# Patient Record
Sex: MALE | Race: WHITE | Employment: OTHER | URBAN - METROPOLITAN AREA
[De-identification: names, ages, dates, MRNs, and addresses within clinical notes are randomized per-mention and may not be internally consistent; named-entity substitution may affect disease eponyms.]

---

## 2017-10-02 ENCOUNTER — OFFICE VISIT (OUTPATIENT)
Dept: DERMATOLOGY | Age: 72
End: 2017-10-02

## 2017-10-02 DIAGNOSIS — Z85.828 HISTORY OF SKIN CANCER: ICD-10-CM

## 2017-10-02 DIAGNOSIS — D22.9 MULTIPLE NEVI: Primary | ICD-10-CM

## 2017-10-02 DIAGNOSIS — Z86.018 HISTORY OF DYSPLASTIC NEVUS: ICD-10-CM

## 2017-10-02 DIAGNOSIS — L57.0 AK (ACTINIC KERATOSIS): ICD-10-CM

## 2017-10-02 DIAGNOSIS — L82.1 SEBORRHEIC KERATOSIS: ICD-10-CM

## 2017-10-02 DIAGNOSIS — D48.5 NEOPLASM OF UNCERTAIN BEHAVIOR OF SKIN: ICD-10-CM

## 2017-10-02 PROCEDURE — 17000 DESTRUCT PREMALG LESION: CPT | Performed by: DERMATOLOGY

## 2017-10-02 PROCEDURE — 11100 PR BIOPSY OF SKIN LESION: CPT | Performed by: DERMATOLOGY

## 2017-10-02 PROCEDURE — 99213 OFFICE O/P EST LOW 20 MIN: CPT | Performed by: DERMATOLOGY

## 2017-10-02 PROCEDURE — 17003 DESTRUCT PREMALG LES 2-14: CPT | Performed by: DERMATOLOGY

## 2017-10-02 NOTE — MR AVS SNAPSHOT
After Visit Summary             Frank Bar   10/2/2017 8:45 AM   Office Visit    Description:  Male : 1945   Provider:  Meredith Broussard MD   Department:  [facility]              Your Follow-Up and Future Appointments         Below is a list of your follow-up and future appointments. This may not be a complete list as you may have made appointments directly with providers that we are not aware of or your providers may have made some for you. Please call your providers to confirm appointments. It is important to keep your appointments. Please bring your current insurance card, photo ID, co-pay, and all medication bottles to your appointment. If self-pay, payment is expected at the time of service. Your To-Do List     Follow-Up    Return in about 1 year (around 10/2/2018). Information from Your Visit        Department     Name Address Phone Fax    [facility] [id number] [phone]      Vital Signs     Smoking Status                   Never Smoker           Instructions      Biopsy Wound Care Instructions    · Keep the bandage in place for 24 hours. · Cleanse the wound with mild soapy water daily  ? Gently dry the area. ? Apply Vaseline or petroleum jelly to the wound using a cotton tipped applicator. ? Cover with a clean bandage. ? Repeat this process until the biopsy site is healed. ? If you had stitches placed, continue treating the site until the stitches are removed. Remember to make an appointment to return to have your stitches removed by our staff. ? You may shower and bathe as usual.       ** Biopsy results generally take around 7 business days to come back. If you have not heard from us by then, please call the office at [phone] between 8AM and 4PM Monday through Friday.       Cryosurgery (Freezing) Wound Care Instructions    AFTER THE PROCEDURE:

## 2017-10-02 NOTE — PROGRESS NOTES
well-appearing  The following were examined and determined to be normal: Psych/Neuro, Conjunctivae/eyelids, Gums/teeth/lips, Neck, Breast/chest, RUE, LUE, RLE, LLE, Nails/digits and buttocks. Abdomen. scalp, axilla. The following were examined and determined to be abnormal: face, back  trunk and extremities with scattered brown and red macules and papules   Scar(s) clear  FH, L temple and L infraorbital with erythematous roughly scaled macules  L flank with stuck-on dull-surfaced brown papule with peripheral erythema  Scalp clear except for prurigo/scar on the occiput    Assessment and Plan     1. Benign-appearing nevi, SK's, and hx of dysplastic nevus  - educ re ABCD's of MM   educ sun protection   encouraged skin check yearly (sooner if indicated), self checks    2. AK - FH, L temple and L infraorbital  - 3 lesion(s) on the above areas treated with liquid nitrogen x 2 cycles. Patient educated on risk of blister, hypopigmentation/scar and wound care. 3. Hx of NMSC, no signs of recurrence  - educ re ABCD's of MM   educ sun protection   encouraged skin check yearly (sooner if indicated), self checks    4. R/o ISK - L flank  - Shave biopsy performed after verbal consent obtained. Patient educated regarding risk of bleeding, infection, scar and educated on wound care. Skin cleansed with alcohol pad and site anesthetized with lido + epi. Aluminum chloride applied to site for hemostasis. Petrolatum ointment and bandage applied. Specimen bottle labeled with patient information and site and specimen sent to [facility].      Previously addressed Folliculitis - scalp - clinda soln daily - bid prn flares

## 2017-10-09 ENCOUNTER — TELEPHONE (OUTPATIENT)
Dept: DERMATOLOGY | Age: 72
End: 2017-10-09

## 2018-10-08 ENCOUNTER — OFFICE VISIT (OUTPATIENT)
Dept: DERMATOLOGY | Age: 73
End: 2018-10-08
Payer: MEDICARE

## 2018-10-08 DIAGNOSIS — S90.222A: ICD-10-CM

## 2018-10-08 DIAGNOSIS — D48.5 NEOPLASM OF UNCERTAIN BEHAVIOR OF SKIN: ICD-10-CM

## 2018-10-08 DIAGNOSIS — Z86.018 HISTORY OF DYSPLASTIC NEVUS: ICD-10-CM

## 2018-10-08 DIAGNOSIS — D22.9 MULTIPLE NEVI: Primary | ICD-10-CM

## 2018-10-08 DIAGNOSIS — Z85.828 HISTORY OF NONMELANOMA SKIN CANCER: ICD-10-CM

## 2018-10-08 DIAGNOSIS — L57.0 AK (ACTINIC KERATOSIS): ICD-10-CM

## 2018-10-08 DIAGNOSIS — L82.1 SEBORRHEIC KERATOSIS: ICD-10-CM

## 2018-10-08 PROCEDURE — 11100 PR BIOPSY OF SKIN LESION: CPT | Performed by: DERMATOLOGY

## 2018-10-08 PROCEDURE — 3017F COLORECTAL CA SCREEN DOC REV: CPT | Performed by: DERMATOLOGY

## 2018-10-08 PROCEDURE — 99213 OFFICE O/P EST LOW 20 MIN: CPT | Performed by: DERMATOLOGY

## 2018-10-08 PROCEDURE — 4040F PNEUMOC VAC/ADMIN/RCVD: CPT | Performed by: DERMATOLOGY

## 2018-10-08 PROCEDURE — G8484 FLU IMMUNIZE NO ADMIN: HCPCS | Performed by: DERMATOLOGY

## 2018-10-08 PROCEDURE — 1101F PT FALLS ASSESS-DOCD LE1/YR: CPT | Performed by: DERMATOLOGY

## 2018-10-08 PROCEDURE — 17003 DESTRUCT PREMALG LES 2-14: CPT | Performed by: DERMATOLOGY

## 2018-10-08 PROCEDURE — G8427 DOCREV CUR MEDS BY ELIG CLIN: HCPCS | Performed by: DERMATOLOGY

## 2018-10-08 PROCEDURE — G8421 BMI NOT CALCULATED: HCPCS | Performed by: DERMATOLOGY

## 2018-10-08 PROCEDURE — 1123F ACP DISCUSS/DSCN MKR DOCD: CPT | Performed by: DERMATOLOGY

## 2018-10-08 PROCEDURE — 17000 DESTRUCT PREMALG LESION: CPT | Performed by: DERMATOLOGY

## 2018-10-08 PROCEDURE — 1036F TOBACCO NON-USER: CPT | Performed by: DERMATOLOGY

## 2018-10-08 RX ORDER — UBIDECARENONE 10 MG
CAPSULE ORAL
COMMUNITY

## 2018-10-08 RX ORDER — CLINDAMYCIN PHOSPHATE 11.9 MG/ML
SOLUTION TOPICAL
Qty: 60 ML | Refills: 4 | Status: SHIPPED | OUTPATIENT
Start: 2018-10-08 | End: 2020-11-24 | Stop reason: SDUPTHER

## 2018-10-08 NOTE — PATIENT INSTRUCTIONS
Biopsy Wound Care Instructions    · Keep the bandage in place for 24 hours. · Cleanse the wound with mild soapy water daily   Gently dry the area.  Apply Vaseline or petroleum jelly to the wound using a cotton tipped applicator.  Cover with a clean bandage.  Repeat this process until the biopsy site is healed.  If you had stitches placed, continue treating the site until the stitches are removed. Remember to make an appointment to return to have your stitches removed by our staff.  You may shower and bathe as usual.       ** Biopsy results generally take around 7 business days to come back. If you have not heard from us by then, please call the office at [phone] between 8AM and 4PM Monday through Friday. Cryosurgery (Freezing) Wound Care Instructions    AFTER THE PROCEDURE:    You will notice swelling and redness around the site. This is normal.    You may experience a sharp or sore feeling for the next several days. For this discomfort, you may take acetaminophen (Tylenol©).  A blister may develop at the treated area, sometimes as soon as by the end of the day. After several days, the blister will subside and a scab will form.  If the area is bumped or traumatized during the first few days following freezing, you may develop bleeding into the blister, forming a blood blister. This is nothing to be alarmed about.  If the blister is tense, uncomfortable, or much larger than the site that was frozen, you may pop the blister along its edge with a sterile needle (boiled, heated under a flame, or cleaned with alcohol) to allow the fluid to drain out. If the blister does not bother you, no treatment is needed.  Do NOT peel off the top of the blister roof. It will act as a dressing on top of your wound. WOUND CARE:    You may shower or bathe as usual, but avoid scrubbing the areas that have been frozen.      Cleanse the site twice a day with mild soapy water, and then apply a

## 2018-10-10 LAB — DERMATOLOGY PATHOLOGY REPORT: NORMAL

## 2018-10-19 ENCOUNTER — OFFICE VISIT (OUTPATIENT)
Dept: ORTHOPEDIC SURGERY | Age: 73
End: 2018-10-19
Payer: MEDICARE

## 2018-10-19 VITALS
BODY MASS INDEX: 28.63 KG/M2 | WEIGHT: 200 LBS | DIASTOLIC BLOOD PRESSURE: 84 MMHG | SYSTOLIC BLOOD PRESSURE: 139 MMHG | HEART RATE: 67 BPM | HEIGHT: 70 IN

## 2018-10-19 DIAGNOSIS — M25.562 LEFT KNEE PAIN, UNSPECIFIED CHRONICITY: Primary | ICD-10-CM

## 2018-10-19 PROCEDURE — G8419 CALC BMI OUT NRM PARAM NOF/U: HCPCS | Performed by: ORTHOPAEDIC SURGERY

## 2018-10-19 PROCEDURE — 4040F PNEUMOC VAC/ADMIN/RCVD: CPT | Performed by: ORTHOPAEDIC SURGERY

## 2018-10-19 PROCEDURE — 1036F TOBACCO NON-USER: CPT | Performed by: ORTHOPAEDIC SURGERY

## 2018-10-19 PROCEDURE — 3017F COLORECTAL CA SCREEN DOC REV: CPT | Performed by: ORTHOPAEDIC SURGERY

## 2018-10-19 PROCEDURE — G8484 FLU IMMUNIZE NO ADMIN: HCPCS | Performed by: ORTHOPAEDIC SURGERY

## 2018-10-19 PROCEDURE — 1123F ACP DISCUSS/DSCN MKR DOCD: CPT | Performed by: ORTHOPAEDIC SURGERY

## 2018-10-19 PROCEDURE — G8427 DOCREV CUR MEDS BY ELIG CLIN: HCPCS | Performed by: ORTHOPAEDIC SURGERY

## 2018-10-19 PROCEDURE — 20610 DRAIN/INJ JOINT/BURSA W/O US: CPT | Performed by: ORTHOPAEDIC SURGERY

## 2018-10-19 PROCEDURE — 1101F PT FALLS ASSESS-DOCD LE1/YR: CPT | Performed by: ORTHOPAEDIC SURGERY

## 2018-10-19 PROCEDURE — 99203 OFFICE O/P NEW LOW 30 MIN: CPT | Performed by: ORTHOPAEDIC SURGERY

## 2018-10-19 NOTE — PROGRESS NOTES
Depo medrol NDC [id number]  Lot# [id number]  Exp.8/2020   Knee, left    Ropivacaine    [facility]#:  [id number]  LOT#:  [id number]  Exp Date:  05/22  Knee, left
compression of patella. Nontender to light touch. He has pain at the mid body of the meniscus on the medial side. No lateral joint line pain. Range of Motion: Full ROM. Strength: 5/5 quad strength    Effusion: No apparent effusion. Ligamentous stability: Stable to valgus and varus stress at 0° and 30°. Solid endpoint with Lachman's. Negative posterior and anterior drawer signs. Patella tracking: Smooth translation of patella. Negative J sign. No retinacular tenderness. Special tests: Positive Debra sign with compression of the medial compartment but no appreciable click or pop. . Patella apprehension sign negative. Neurologic and vascular: Skin is warm and well-perfused. Distally neurovascularly intact. Additional findings: Calf soft nontender. Sensation is intact to light-touch. No pretibial edema. Right comparison knee exam    Gait: No use of assistive devices. No antalgic gait. Alignment: Alignment appreciated. Inspection/skin: Quad atrophy as well as actually below the knee is appreciated consistent with history of polio. Skin is intact without erythema or ecchymosis. No gross deformity. Palpation: No crepitus. Nontender along joint line. No pain with compression of patella. Nontender to light touch. Range of Motion: Full ROM. Strength: 4/5 quad strength    Effusion: No apparent effusion. Ligamentous stability: Stable to valgus and varus stress at 0° and 30°. Solid endpoint with Lachman's. Negative posterior and anterior drawer signs. Patella tracking: Smooth translation of patella. Negative J sign. No retinacular tenderness. Special tests: Negative Debra sign. Patella apprehension sign negative. Neurologic and vascular: Skin is warm and well-perfused. Distally neurovascularly intact. Additional findings: Calf soft nontender. Sensation is intact to light-touch. No pretibial edema.          Diagnostics:  Radiology:     Standing bilateral AP as

## 2019-11-26 ENCOUNTER — OFFICE VISIT (OUTPATIENT)
Dept: DERMATOLOGY | Age: 74
End: 2019-11-26
Payer: MEDICARE

## 2019-11-26 DIAGNOSIS — D48.5 NEOPLASM OF UNCERTAIN BEHAVIOR OF SKIN: ICD-10-CM

## 2019-11-26 DIAGNOSIS — L82.1 SEBORRHEIC KERATOSIS: ICD-10-CM

## 2019-11-26 DIAGNOSIS — Z85.828 HISTORY OF NONMELANOMA SKIN CANCER: ICD-10-CM

## 2019-11-26 DIAGNOSIS — Z86.018 HISTORY OF DYSPLASTIC NEVUS: ICD-10-CM

## 2019-11-26 DIAGNOSIS — D22.9 MULTIPLE NEVI: Primary | ICD-10-CM

## 2019-11-26 DIAGNOSIS — L82.0 INFLAMED SEBORRHEIC KERATOSIS: ICD-10-CM

## 2019-11-26 PROCEDURE — G8427 DOCREV CUR MEDS BY ELIG CLIN: HCPCS | Performed by: DERMATOLOGY

## 2019-11-26 PROCEDURE — 17110 DESTRUCTION B9 LES UP TO 14: CPT | Performed by: DERMATOLOGY

## 2019-11-26 PROCEDURE — G8484 FLU IMMUNIZE NO ADMIN: HCPCS | Performed by: DERMATOLOGY

## 2019-11-26 PROCEDURE — 1036F TOBACCO NON-USER: CPT | Performed by: DERMATOLOGY

## 2019-11-26 PROCEDURE — 1123F ACP DISCUSS/DSCN MKR DOCD: CPT | Performed by: DERMATOLOGY

## 2019-11-26 PROCEDURE — 99213 OFFICE O/P EST LOW 20 MIN: CPT | Performed by: DERMATOLOGY

## 2019-11-26 PROCEDURE — 4040F PNEUMOC VAC/ADMIN/RCVD: CPT | Performed by: DERMATOLOGY

## 2019-11-26 PROCEDURE — 11103 TANGNTL BX SKIN EA SEP/ADDL: CPT | Performed by: DERMATOLOGY

## 2019-11-26 PROCEDURE — G8421 BMI NOT CALCULATED: HCPCS | Performed by: DERMATOLOGY

## 2019-11-26 PROCEDURE — 11102 TANGNTL BX SKIN SINGLE LES: CPT | Performed by: DERMATOLOGY

## 2019-12-03 LAB — DERMATOLOGY PATHOLOGY REPORT: NORMAL

## 2020-10-09 ENCOUNTER — OFFICE VISIT (OUTPATIENT)
Dept: ORTHOPEDIC SURGERY | Age: 75
End: 2020-10-09
Payer: MEDICARE

## 2020-10-09 VITALS — HEIGHT: 70 IN | WEIGHT: 200 LBS | BODY MASS INDEX: 28.63 KG/M2 | TEMPERATURE: 98.6 F

## 2020-10-09 PROCEDURE — G8484 FLU IMMUNIZE NO ADMIN: HCPCS | Performed by: ORTHOPAEDIC SURGERY

## 2020-10-09 PROCEDURE — 1036F TOBACCO NON-USER: CPT | Performed by: ORTHOPAEDIC SURGERY

## 2020-10-09 PROCEDURE — 1123F ACP DISCUSS/DSCN MKR DOCD: CPT | Performed by: ORTHOPAEDIC SURGERY

## 2020-10-09 PROCEDURE — G8427 DOCREV CUR MEDS BY ELIG CLIN: HCPCS | Performed by: ORTHOPAEDIC SURGERY

## 2020-10-09 PROCEDURE — G8417 CALC BMI ABV UP PARAM F/U: HCPCS | Performed by: ORTHOPAEDIC SURGERY

## 2020-10-09 PROCEDURE — 99213 OFFICE O/P EST LOW 20 MIN: CPT | Performed by: ORTHOPAEDIC SURGERY

## 2020-10-09 PROCEDURE — 4040F PNEUMOC VAC/ADMIN/RCVD: CPT | Performed by: ORTHOPAEDIC SURGERY

## 2020-10-09 PROCEDURE — 20610 DRAIN/INJ JOINT/BURSA W/O US: CPT | Performed by: ORTHOPAEDIC SURGERY

## 2020-10-09 RX ORDER — METHYLPREDNISOLONE ACETATE 40 MG/ML
40 INJECTION, SUSPENSION INTRA-ARTICULAR; INTRALESIONAL; INTRAMUSCULAR; SOFT TISSUE ONCE
Status: COMPLETED | OUTPATIENT
Start: 2020-10-09 | End: 2020-10-09

## 2020-10-09 RX ORDER — ROPIVACAINE HYDROCHLORIDE 5 MG/ML
30 INJECTION, SOLUTION EPIDURAL; INFILTRATION; PERINEURAL ONCE
Status: COMPLETED | OUTPATIENT
Start: 2020-10-09 | End: 2020-10-09

## 2020-10-09 RX ADMIN — METHYLPREDNISOLONE ACETATE 40 MG: 40 INJECTION, SUSPENSION INTRA-ARTICULAR; INTRALESIONAL; INTRAMUSCULAR; SOFT TISSUE at 11:43

## 2020-10-09 RX ADMIN — ROPIVACAINE HYDROCHLORIDE 30 ML: 5 INJECTION, SOLUTION EPIDURAL; INFILTRATION; PERINEURAL at 11:44

## 2020-10-09 NOTE — PROGRESS NOTES
losartan-hydrochlorothiazide (HYZAAR) 100-25 MG per tablet, , Disp: , Rfl:     aspirin 81 MG tablet, Take 81 mg by mouth daily, Disp: , Rfl:     Multiple Vitamins-Minerals (CENTRUM SILVER) TABS, Take by mouth daily, Disp: , Rfl:     melatonin 3 MG TABS tablet, Take 9 mg by mouth daily, Disp: , Rfl:     meloxicam (MOBIC) 15 MG tablet, , Disp: , Rfl:       Allergies   Allergen Reactions    Statins      Muscle pain and weakness         Review of Systems: A 14 point review of systems was completed by the patient on 10/9/20and is available in the media section of the scanned medical record and was reviewed today  The review is negative with the exception of those things mentioned in the HPI    No notes on file    Physical Exam:  There were no vitals taken for this visit. General: No acute distress, well nourished  CV: No obvious peripheral edema. Normal peripheral pulses  Neuro: Alert & oriented x 3  Psych: Appropriate mood and affect      Right knee exam      Alignment: normal alignment. Inspection/skin: Skin is intact without erythema or ecchymosis. Significant gastroc atrophy compared to contralateral side due to polio    Palpation: mild crepitus. Medial joint line tenderness. Fullness along the posterior aspect of the knee. Range of Motion: Lacks 5 degrees of full extension. Flexion 9 degrees    Strength: Normal quadriceps development. Effusion: Moderate effusion. Ligamentous stability: No cruciate or collateral ligament instability. Neurologic and vascular: Skin is warm and well-perfused. Sensation is intact to light-touch. Special tests: Negative Debra sign. Comparison Left knee exam    Gait: No use of assistive devices. No antalgic gait. Alignment: normal alignment. Inspection/skin: Skin is intact without erythema or ecchymosis. No gross deformity. Palpation: mild crepitus. no joint line tenderness present. Range of Motion: There is full range of motion. Strength: Normal quadriceps development. Effusion: No effusion or swelling present. Ligamentous stability: No cruciate or collateral ligament instability. Neurologic and vascular: Skin is warm and well-perfused. Sensation is intact to light-touch. Special tests: Negative Debra sign. Radiographic:  X-rays obtained and reviewed in office, reviewed and interpreted by me today:  Bilateral knee AP, merchant, lateral R knee: no fracture or dislocation. Medial joint space narrowing. Subchondral sclerosis medially. Assessment:  Hailey Liang is a 66 y.o. male with right knee osteoarthritis, possible medial meniscus tear  Impression:  No diagnosis found. Office Procedures:  No orders of the defined types were placed in this encounter. Plan:   -Knee aspirated and steroid injection provided today  -continue NSAIDs PRN  -If no improvement in symptoms, will consider obtaining an MRI of the right knee within 1 to 2 weeks otherwise can follow-up in 1 month's time    Procedure note: R knee intra-articular injection/aspiration    After discussion of the risk, benefits, alternatives, patient agreeable to proceed with intra-articular steroid injection. Skin prepped with alcohol under sterile technique. 2 cc of ropivacaine injected in the skin at the superolateral patellar pouch. Utilizing an 18-gauge needle 50 cc of clear synovial joint fluid aspirated. Solution of 80 mg of Depo-Medrol with 15 mg of ropivacaine injected intra-articularly in the same area as aspiration. Sterile dressing was applied. Patient tolerated procedure well. Efrain Bella MD  Orthopaedic Fellow  [facility] and [address] [place] [address]      The encounter with Hailey Liang was supervised by Dr Virginia Jorgensen who personally examined the patient and reviewed the plan. This dictation was performed with a verbal recognition program (DRAGON) and it was checked for errors.   It is possible that there are still dictated errors within this office note. If so, please bring any errors to my attention for an addendum. All efforts were made to ensure that this office note is accurate. Attestation:  I was physically present and performed my own examination of this patient and have discussed the case, including pertinent history and exam findings with the fellow. I agree with the documented assessment and plan. Jovanna Glasgow.  Mario Florez MD

## 2020-11-06 ENCOUNTER — OFFICE VISIT (OUTPATIENT)
Dept: ORTHOPEDIC SURGERY | Age: 75
End: 2020-11-06
Payer: MEDICARE

## 2020-11-06 VITALS — BODY MASS INDEX: 28.63 KG/M2 | WEIGHT: 200 LBS | HEIGHT: 70 IN | TEMPERATURE: 97.6 F

## 2020-11-06 PROCEDURE — 1036F TOBACCO NON-USER: CPT | Performed by: ORTHOPAEDIC SURGERY

## 2020-11-06 PROCEDURE — G8484 FLU IMMUNIZE NO ADMIN: HCPCS | Performed by: ORTHOPAEDIC SURGERY

## 2020-11-06 PROCEDURE — G8417 CALC BMI ABV UP PARAM F/U: HCPCS | Performed by: ORTHOPAEDIC SURGERY

## 2020-11-06 PROCEDURE — 99212 OFFICE O/P EST SF 10 MIN: CPT | Performed by: ORTHOPAEDIC SURGERY

## 2020-11-06 PROCEDURE — 1123F ACP DISCUSS/DSCN MKR DOCD: CPT | Performed by: ORTHOPAEDIC SURGERY

## 2020-11-06 PROCEDURE — 4040F PNEUMOC VAC/ADMIN/RCVD: CPT | Performed by: ORTHOPAEDIC SURGERY

## 2020-11-06 PROCEDURE — G8427 DOCREV CUR MEDS BY ELIG CLIN: HCPCS | Performed by: ORTHOPAEDIC SURGERY

## 2020-11-06 NOTE — PROGRESS NOTES
The patient returns today for follow-up of his right knee. He had some swelling and pain in a month ago we did an aspiration and injection. He said it took about a week but he is back to baseline now with minimal tenderness and is functioning normally. ROS: Pertinent items are noted in HPI. No notes on file    Past Medical History:  No date: Hyperlipidemia  No date: Hypertension     History reviewed. No pertinent surgical history. History reviewed. No pertinent family history. Social History    Socioeconomic History      Marital status:       Spouse name: None      Number of children: None      Years of education: None      Highest education level: None    Occupational History      None    Social Needs      Financial resource strain: None      Food insecurity        Worry: None        Inability: None      Transportation needs        Medical: None        Non-medical: None    Tobacco Use      Smoking status: Never Smoker      Smokeless tobacco: Never Used    Substance and Sexual Activity      Alcohol use: None      Drug use: None      Sexual activity: None    Lifestyle      Physical activity        Days per week: None        Minutes per session: None      Stress: None    Relationships      Social connections        Talks on phone: None        Gets together: None        Attends Confucianist service: None        Active member of club or organization: None        Attends meetings of clubs or organizations: None        Relationship status: None      Intimate partner violence        Fear of current or ex partner: None        Emotionally abused: None        Physically abused: None        Forced sexual activity: None    Other Topics      Concerns:        None    Social History Narrative      None      Current Outpatient Medications:  Coenzyme Q10 10 MG CAPS, Take by mouth, Disp: , Rfl:   clindamycin (CLEOCIN T) 1 % external solution, Apply to affected areas on the scalp daily - BID prn flares. , Disp: 60

## 2020-11-24 ENCOUNTER — OFFICE VISIT (OUTPATIENT)
Dept: DERMATOLOGY | Age: 75
End: 2020-11-24
Payer: MEDICARE

## 2020-11-24 VITALS — TEMPERATURE: 97.3 F

## 2020-11-24 PROCEDURE — G8417 CALC BMI ABV UP PARAM F/U: HCPCS | Performed by: DERMATOLOGY

## 2020-11-24 PROCEDURE — 17000 DESTRUCT PREMALG LESION: CPT | Performed by: DERMATOLOGY

## 2020-11-24 PROCEDURE — G8484 FLU IMMUNIZE NO ADMIN: HCPCS | Performed by: DERMATOLOGY

## 2020-11-24 PROCEDURE — 1036F TOBACCO NON-USER: CPT | Performed by: DERMATOLOGY

## 2020-11-24 PROCEDURE — 99214 OFFICE O/P EST MOD 30 MIN: CPT | Performed by: DERMATOLOGY

## 2020-11-24 PROCEDURE — 17003 DESTRUCT PREMALG LES 2-14: CPT | Performed by: DERMATOLOGY

## 2020-11-24 PROCEDURE — 4040F PNEUMOC VAC/ADMIN/RCVD: CPT | Performed by: DERMATOLOGY

## 2020-11-24 PROCEDURE — G8427 DOCREV CUR MEDS BY ELIG CLIN: HCPCS | Performed by: DERMATOLOGY

## 2020-11-24 PROCEDURE — 1123F ACP DISCUSS/DSCN MKR DOCD: CPT | Performed by: DERMATOLOGY

## 2020-11-24 RX ORDER — CLINDAMYCIN PHOSPHATE 11.9 MG/ML
SOLUTION TOPICAL
Qty: 60 ML | Refills: 4 | Status: SHIPPED | OUTPATIENT
Start: 2020-11-24 | End: 2022-07-20 | Stop reason: SDUPTHER

## 2020-11-24 NOTE — PROGRESS NOTES
[facility] Dermat[facility]  López Mireles MD  [address]  1945    66 y.o. male     Date of Visit: 11/24/2020    Chief Complaint: f/u skin cancer, lesions  Chief Complaint   Patient presents with    Skin Lesion     FSe       HX: Multi nevi     Last seen:   his wife is a patient    History of Present Illness:    1. Here for evaluation of multiple asx brown and red lesions on the trunk and extremities, present for many years; no change in size/shape/color of any lesions; no bleeding lesions. S/p bx of lesion on the abdomen at previous visit - read as atypical nevus (mild dysplasia, removed with bx). No probs since last seen. 2. Hx AK's - few new concerns on the face. Asx.    3. History of skin cancer - BCC on the face and SCC on the chest.  No probs with previous sites. No new concerns. 4. F/sofy folliculitis - scalp - continues to flare focally, intermittently. clinda topically helps. Previously with discoloration of the L 3rd toenail - grew out and resolved. superficial SCC of chest (8/2009) s/p ED&C - treated at [facility] on the face    He avoids the sun or wears sunscreen/hat when he is out. No tanning bed use. 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. Review of Systems:  Gen: Feels well, good sense of health. Skin: No changing moles or lesions. Past Medical History, Family History, Surgical History, Medications and Allergies reviewed. Past Medical History:   Diagnosis Date    Hyperlipidemia     Hypertension        No past surgical history on file. Outpatient Medications Marked as Taking for the 11/24/20 encounter (Office Visit) with Shelby Pretty MD   Medication Sig Dispense Refill    Coenzyme Q10 10 MG CAPS Take by mouth      clindamycin (CLEOCIN T) 1 % external solution Apply to affected areas on the scalp daily - BID prn flares.  60 mL 4    losartan-hydrochlorothiazide (HYZAAR) 100-25 MG per tablet       aspirin 81 MG tablet Take 81 mg by mouth daily      Multiple Vitamins-Minerals (CENTRUM SILVER) TABS Take by mouth daily      meloxicam (MOBIC) 15 MG tablet          Allergies   Allergen Reactions    Statins      Muscle pain and weakness         Physical Examination     Gen, well-appearing  The following were examined and determined to be normal: Psych/Neuro, Conjunctivae/eyelids, Gums/teeth/lips, Neck, Breast/chest, RUE, RLE, LLE, Nails/digits and buttocks. Abdomen. scalp, axilla. back    The following were examined and determined to be abnormal: face, LUE  trunk and extremities with scattered brown and red macules and papules   Scar(s) clear  Face with erythematous roughly scaled macules  Scalp clear except for 2 excoriations  L 3rd toenail with hemorrhage again after clear last year    Occipital scalp with 2 crusted excoriations with mild peripheral lichenification                Assessment and Plan     1. Benign-appearing nevi, SK's, and hx of dysplastic nevus  - educ re ABCD's of MM   educ sun protection   encouraged skin check yearly (sooner if indicated), self checks    2. AK - 3 new lesions today on the face  - 3 lesion(s) treated with liquid nitrogen x 2 cycles. Patient educated on risk of blister, hypopigmentation/scar and wound care. 3. Hx of NMSC, no signs of recurrence  - educ re ABCD's of MM   educ sun protection   encouraged skin check yearly (sooner if indicated), self checks    4.  Folliculitis + prurigo- scalp - mild and focal  - clinda soln daily - bid prn flares  - avoid scratching

## 2022-02-08 ENCOUNTER — OFFICE VISIT (OUTPATIENT)
Dept: DERMATOLOGY | Age: 77
End: 2022-02-08
Payer: MEDICARE

## 2022-02-08 VITALS — TEMPERATURE: 97.2 F

## 2022-02-08 DIAGNOSIS — Z85.828 HISTORY OF NONMELANOMA SKIN CANCER: ICD-10-CM

## 2022-02-08 DIAGNOSIS — Z86.018 HISTORY OF DYSPLASTIC NEVUS: ICD-10-CM

## 2022-02-08 DIAGNOSIS — L82.0 INFLAMED SEBORRHEIC KERATOSIS: ICD-10-CM

## 2022-02-08 DIAGNOSIS — L57.0 AK (ACTINIC KERATOSIS): ICD-10-CM

## 2022-02-08 DIAGNOSIS — L82.1 SEBORRHEIC KERATOSIS: ICD-10-CM

## 2022-02-08 DIAGNOSIS — D22.9 MULTIPLE NEVI: Primary | ICD-10-CM

## 2022-02-08 PROCEDURE — G8427 DOCREV CUR MEDS BY ELIG CLIN: HCPCS | Performed by: DERMATOLOGY

## 2022-02-08 PROCEDURE — 1123F ACP DISCUSS/DSCN MKR DOCD: CPT | Performed by: DERMATOLOGY

## 2022-02-08 PROCEDURE — 4040F PNEUMOC VAC/ADMIN/RCVD: CPT | Performed by: DERMATOLOGY

## 2022-02-08 PROCEDURE — 17110 DESTRUCTION B9 LES UP TO 14: CPT | Performed by: DERMATOLOGY

## 2022-02-08 PROCEDURE — G8484 FLU IMMUNIZE NO ADMIN: HCPCS | Performed by: DERMATOLOGY

## 2022-02-08 PROCEDURE — 99213 OFFICE O/P EST LOW 20 MIN: CPT | Performed by: DERMATOLOGY

## 2022-02-08 PROCEDURE — G8421 BMI NOT CALCULATED: HCPCS | Performed by: DERMATOLOGY

## 2022-02-08 PROCEDURE — 1036F TOBACCO NON-USER: CPT | Performed by: DERMATOLOGY

## 2022-02-08 RX ORDER — CHLORAL HYDRATE 500 MG
CAPSULE ORAL
COMMUNITY
Start: 2022-01-10

## 2022-02-08 RX ORDER — LOSARTAN POTASSIUM 100 MG/1
TABLET ORAL
COMMUNITY
Start: 2021-09-27

## 2022-02-08 RX ORDER — EZETIMIBE 10 MG/1
TABLET ORAL
COMMUNITY
Start: 2021-10-25

## 2022-02-08 RX ORDER — HYDROCHLOROTHIAZIDE 25 MG/1
TABLET ORAL
COMMUNITY
Start: 2021-12-26

## 2022-02-08 NOTE — PROGRESS NOTES
[facility] Dermatology  Kimberly Dominguez MD  [address]  1945    64 y.o. male     Date of Visit: 2/8/2022    Chief Complaint: f/u skin cancer, lesions  Chief Complaint   Patient presents with    Skin Lesion     Last seen:   his wife is a patient    History of Present Illness:    1. Here for evaluation of multiple asx brown and red lesions on the trunk and extremities, present for many years; no change in size/shape/color of any lesions; no bleeding lesions. S/p bx of lesion on the abdomen at previous visit - read as atypical nevus (mild dysplasia, removed with bx). No probs since last seen. 2. Hx AK's - few new concerns on the face. Asx.    3. Irritated papule on the L NL area. 4. istory of skin cancer - BCC on the face and SCC on the chest.  No probs with previous sites. No new concerns. 5. F/sofy folliculitis - scalp - flares focally, intermittently. clinda topically helps. Previously with discoloration of the L 3rd toenail - grew out and resolved. Has recurred but growing out again. Planning to see podiatrist for chronic ingrown nail. superficial SCC of chest (8/2009) s/p ED&C - treated at [facility] on the face    He avoids the sun or wears sunscreen/hat when he is out. No tanning bed use. 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 Skin: No changing moles or lesions. Past Medical History, Family History, Surgical History, Medications and Allergies reviewed. Past Medical History:   Diagnosis Date    Hyperlipidemia     Hypertension        History reviewed. No pertinent surgical history.     Outpatient Medications Marked as Taking for the 2/8/22 encounter (Office Visit) with Mercedse Melgoza MD   Medication Sig Dispense Refill    ezetimibe (ZETIA) 10 MG tablet TAKE 1 TABLET BY MOUTH DAILY      Omega-3 Fatty Acids (FISH OIL) 1000 MG CAPS TAKE 1 CAPSULE BY MOUTH TWICE DAILY      hydroCHLOROthiazide (HYDRODIURIL) 25 MG tablet       losartan (COZAAR) 100 MG tablet TAKE 1 TABLET BY MOUTH DAILY      clindamycin (CLEOCIN T) 1 % external solution Apply to affected areas on the scalp daily - BID prn flares. 60 mL 4    Coenzyme Q10 10 MG CAPS Take by mouth      aspirin 81 MG tablet Take 81 mg by mouth daily      Multiple Vitamins-Minerals (CENTRUM SILVER) TABS Take by mouth daily      meloxicam (MOBIC) 15 MG tablet          Allergies   Allergen Reactions    Statins      Muscle pain and weakness    Oxycodone-Acetaminophen Rash         Physical Examination     Gen, well-appearing  FSE today     trunk and extremities with scattered brown and red macules and papules   Scar(s) clear  No signif AK's today  L 3rd toenail with hemorrhage under distal 1/2 of nail after clear last year  L NL fold with waxy pinkish-tan papule    Scalp overall fairly clear today                Assessment and Plan     1. Benign-appearing nevi, SK's, and hx of dysplastic nevus  - educ re ABCD's of MM   educ sun protection SPF 30+  encouraged skin check yearly (sooner if indicated), self checks    2. AK -clear today    3. L NL - ISK - LN2 today - 1  - lesion(s) treated with liquid nitrogen x 2 cycles. Patient educated on risk of blister, hypopigmentation/scar and wound care. 4. Hx of NMSC, no signs of recurrence  - educ re ABCD's of MM   educ sun protection SPF 30+  encouraged skin check yearly (sooner if indicated), self checks    5.  Folliculitis + hx prurigo- scalp - clear today  - clinda soln daily - bid prn flares  - avoid scratching

## 2022-07-13 ENCOUNTER — PATIENT MESSAGE (OUTPATIENT)
Dept: DERMATOLOGY | Age: 77
End: 2022-07-13

## 2022-07-20 RX ORDER — CLINDAMYCIN PHOSPHATE 11.9 MG/ML
SOLUTION TOPICAL
Qty: 60 ML | Refills: 4 | Status: SHIPPED | OUTPATIENT
Start: 2022-07-20

## 2023-02-06 ENCOUNTER — OFFICE VISIT (OUTPATIENT)
Dept: DERMATOLOGY | Age: 78
End: 2023-02-06
Payer: MEDICARE

## 2023-02-06 DIAGNOSIS — Z85.828 HISTORY OF NONMELANOMA SKIN CANCER: ICD-10-CM

## 2023-02-06 DIAGNOSIS — L73.9 FOLLICULITIS: ICD-10-CM

## 2023-02-06 DIAGNOSIS — D48.5 NEOPLASM OF UNCERTAIN BEHAVIOR OF SKIN: ICD-10-CM

## 2023-02-06 DIAGNOSIS — L82.1 SEBORRHEIC KERATOSIS: ICD-10-CM

## 2023-02-06 DIAGNOSIS — L57.0 AK (ACTINIC KERATOSIS): ICD-10-CM

## 2023-02-06 DIAGNOSIS — Z86.018 HISTORY OF DYSPLASTIC NEVUS: ICD-10-CM

## 2023-02-06 DIAGNOSIS — D22.9 MULTIPLE NEVI: Primary | ICD-10-CM

## 2023-02-06 PROCEDURE — G8484 FLU IMMUNIZE NO ADMIN: HCPCS | Performed by: DERMATOLOGY

## 2023-02-06 PROCEDURE — G8427 DOCREV CUR MEDS BY ELIG CLIN: HCPCS | Performed by: DERMATOLOGY

## 2023-02-06 PROCEDURE — 1036F TOBACCO NON-USER: CPT | Performed by: DERMATOLOGY

## 2023-02-06 PROCEDURE — 1123F ACP DISCUSS/DSCN MKR DOCD: CPT | Performed by: DERMATOLOGY

## 2023-02-06 PROCEDURE — G8421 BMI NOT CALCULATED: HCPCS | Performed by: DERMATOLOGY

## 2023-02-06 PROCEDURE — 99213 OFFICE O/P EST LOW 20 MIN: CPT | Performed by: DERMATOLOGY

## 2023-02-06 PROCEDURE — 17000 DESTRUCT PREMALG LESION: CPT | Performed by: DERMATOLOGY

## 2023-02-06 PROCEDURE — 11102 TANGNTL BX SKIN SINGLE LES: CPT | Performed by: DERMATOLOGY

## 2023-02-06 NOTE — PATIENT INSTRUCTIONS

## 2023-02-06 NOTE — PROGRESS NOTES
[facility] Dermatology  Mita Cheng MD  [address]  1945    76 y.o. male     Date of Visit: 2/6/2023    Chief Complaint: f/u skin cancer, lesions  Chief Complaint   Patient presents with    Skin Exam     Prostate cancer, coming off of treatments 6 mos follow up with oncologist     Last seen: 2-2022  his wife is a patient    History of Present Illness:    1. Here for evaluation of multiple asx brown and red lesions on the trunk and extremities, present for many years; no change in size/shape/color of any lesions; no bleeding lesions. S/p bx of lesion on the abdomen at previous visit - read as atypical nevus (mild dysplasia, removed with bx). No probs since last seen. 2. F/u AK's - no probs with previous sites and 1 new concern on the nose. Asx. 3. He has one concerning pigmented lesion on the FH that he was not aware of.    4. F/u skin cancer - BCC on the face and SCC on the chest.  No probs with previous sites. No new concerns. 5. F/u folliculitis - scalp - flares focally, intermittently. clinda topically helps. Previously with discoloration of the L 3rd toenail - grew out and resolved. Has recurred but growing out again. Planning to see podiatrist for chronic ingrown nail. superficial SCC of chest (8/2009) s/p ED&C - treated at [facility] on the face    He avoids the sun or wears sunscreen/hat when he is out. No tanning bed use. 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 Review of Systems:  Gen: Feels well, good sense of health. Skin: No changing moles or lesions. Past Medical History, Family History, Surgical History, Medications and Allergies reviewed. Past Medical History:   Diagnosis Date    Hyperlipidemia     Hypertension        No past surgical history on file.     Outpatient Medications Marked as Taking for the 2/6/23 encounter (Office Visit) with Kael Devries MD   Medication Sig Dispense Refill    clindamycin (CLEOCIN T) 1 % external solution Apply to affected areas on the scalp daily - BID prn flares. 60 mL 4    ezetimibe (ZETIA) 10 MG tablet TAKE 1 TABLET BY MOUTH DAILY      Omega-3 Fatty Acids (FISH OIL) 1000 MG CAPS TAKE 1 CAPSULE BY MOUTH TWICE DAILY      hydroCHLOROthiazide (HYDRODIURIL) 25 MG tablet       losartan (COZAAR) 100 MG tablet TAKE 1 TABLET BY MOUTH DAILY      Coenzyme Q10 10 MG CAPS Take by mouth      aspirin 81 MG tablet Take 81 mg by mouth daily      Multiple Vitamins-Minerals (CENTRUM SILVER) TABS Take by mouth daily         Allergies   Allergen Reactions    Statins      Muscle pain and weakness    Oxycodone-Acetaminophen Rash         Physical Examination     Gen, well-appearing  FSE today     trunk and extremities with scattered brown and red macules and papules   Scar(s) clear  L nasal sidewall with erythematous roughly scaled macule  L FH with irregular brown macule    Scalp overall fairly clear today - 2 small sxcorations                Assessment and Plan     1. Benign-appearing nevi, SK's, and hx of dysplastic nevus  - Monitor for ABCD's of MM and si/sx of NMSC  Continue sun protection - OTC sunscreen with SPF 30-50+ recommended and reviewed usage  Encouraged skin check yearly (sooner if indicated), self checks    2. L nasal sidewall - 1 AK  - - 1 lesion(s) treated with liquid nitrogen with cryac or swab. Treated with 2 cycles for 1-5 seconds each after consent from patient. Patient educated on risk of blister, hypopigmentation/scar and wound care. Tolerated well. 3. L FH - r/o lentigo vs LM  - Shave biopsy performed after verbal consent obtained. Patient educated regarding risk of bleeding, infection, scar and educated on wound care. Skin cleansed with alcohol pad and site anesthetized with lido + epi. Aluminum chloride applied to site for hemostasis. Petrolatum ointment and bandage applied.   Specimen bottle labeled with patient information and site and specimen sent to dermpath. 4. Hx of NMSC, no signs of recurrence  - educ re ABCD's of MM   educ sun protection SPF 30+  encouraged skin check yearly (sooner if indicated), self checks    5.  Folliculitis + hx prurigo- scalp - stable/minimal today  - clinda soln daily - bid prn flares  - avoid scratching

## 2023-02-08 LAB — DERMATOLOGY PATHOLOGY REPORT: NORMAL

## 2023-12-09 SDOH — HEALTH STABILITY: PHYSICAL HEALTH: ON AVERAGE, HOW MANY MINUTES DO YOU ENGAGE IN EXERCISE AT THIS LEVEL?: 20 MIN

## 2023-12-09 SDOH — HEALTH STABILITY: PHYSICAL HEALTH: ON AVERAGE, HOW MANY DAYS PER WEEK DO YOU ENGAGE IN MODERATE TO STRENUOUS EXERCISE (LIKE A BRISK WALK)?: 3 DAYS

## 2023-12-12 ENCOUNTER — OFFICE VISIT (OUTPATIENT)
Dept: ORTHOPEDIC SURGERY | Age: 78
End: 2023-12-12

## 2023-12-12 VITALS — WEIGHT: 204 LBS | BODY MASS INDEX: 29.2 KG/M2 | HEIGHT: 70 IN

## 2023-12-12 DIAGNOSIS — M25.562 LEFT KNEE PAIN, UNSPECIFIED CHRONICITY: Primary | ICD-10-CM

## 2023-12-12 NOTE — PROGRESS NOTES
obtaining history from the patient, conducting a physical exam, reviewed imaging, provided patient education and further coordinating care. Sincerely,    Shanique Chavez, DO  Orthopaedic Sports Medicine Fellow      This dictation was performed with a verbal recognition program [facility]) and it was checked for errors. It is possible that there are still dictated errors within th.SIGNNOYES  is office note. If so, please bring any errors to my attention for an addendum. All efforts were made to ensure that this office note is accurate.

## 2024-01-16 ENCOUNTER — HOSPITAL ENCOUNTER (OUTPATIENT)
Dept: PHYSICAL THERAPY | Age: 79
Setting detail: THERAPIES SERIES
Discharge: HOME | End: 2024-01-16
Payer: MEDICARE

## 2024-01-16 DIAGNOSIS — M25.562 CHRONIC PAIN OF LEFT KNEE: Primary | ICD-10-CM

## 2024-01-16 DIAGNOSIS — G89.29 CHRONIC PAIN OF LEFT KNEE: Primary | ICD-10-CM

## 2024-01-16 DIAGNOSIS — R53.1 WEAKNESS GENERALIZED: ICD-10-CM

## 2024-01-16 DIAGNOSIS — R26.2 DIFFICULTY WALKING: ICD-10-CM

## 2024-01-16 DIAGNOSIS — R26.2 DIFFICULTY WALKING DOWN STAIRS: ICD-10-CM

## 2024-01-16 PROCEDURE — 97110 THERAPEUTIC EXERCISES: CPT | Performed by: PHYSICAL THERAPIST

## 2024-01-16 PROCEDURE — 97161 PT EVAL LOW COMPLEX 20 MIN: CPT | Performed by: PHYSICAL THERAPIST

## 2024-01-16 NOTE — FLOWSHEET NOTE
[facility]- Outpatient Rehabilitation and Therapy [address], [place] [address] office: [phone] fax: [phone]      Physical Therapy: TREATMENT/PROGRESS NOTE   Patient: Juan Jose Landers (78 y.o. male)   Treatment Date: 2024   :  1945 MRN: [id number]   Visit #: 1   Insurance Allowable Auth Needed   [place] [x]Yes    []No    Insurance: Payor: HUMANA MEDICARE / Plan: HUMANA GOLD PLUS HMO / Product Type: *No Product type* /   Insurance ID: [id number] - (Medicare Managed)  Secondary Insurance (if applicable):    Treatment Diagnosis:     ICD-10-CM    1. Chronic pain of left knee  M25.562     G89.29       2. Weakness generalized  R53.1       3. Difficulty walking  R26.2       4. Difficulty walking down stairs  R26.2          Medical Diagnosis:    Localized osteoarthritis of left knee [M17.12]   Referring Physician: Deep Lopez MD  PCP: Prakash Shen MD                             Plan of care signed (Y/N):     Date of Patient follow up with Physician:      Progress Report/POC: EVAL today  POC update due: (10 visits /OR AUTH LIMITS, whichever is less)  2/15/2024     Precautions/ Contra-indications:                                                                                          Latex allergy:  NO  Pacemaker:    NO  Contraindications for Manipulation: NA  Date of Surgery: N/A  Other:      Red Flags:  None     C-SSRS Triggered by Intake questionnaire:   [x] No, Questionnaire did not trigger screening.   [] Yes, Patient intake triggered further evaluation      [] C-SSRS Screening completed  [] PCP notified via Plan of Care  [] Emergency services notified    Preferred Language for Healthcare:   [x]English       []other:    SUBJECTIVE EXAMINATION     Patient Report/Comments: see eval     Test used Initial score  2024   Pain Summary VAS 5/10    Functional questionnaire LEFS 48/80=60% (40% deficit)    Other:                OBJECTIVE EXAMINATION

## 2024-01-16 NOTE — PLAN OF CARE
[facility]- Outpatient Rehabilitation and Therapy [address], [address], [place] [address] office: [phone] fax: [phone]     Physical Therapy Certification      Dear Deep Lopez MD,    We had the pleasure of evaluating the following patient for physical therapy services at [facility].  A summary of our findings can be found in the initial assessment below.  This includes our plan of care.  If you have any questions or concerns regarding these findings, please do not hesitate to contact me at the office phone number listed above.  Thank you for the referral.     Physician Signature:_______________________________Date:__________________  By signing above (or electronic signature), therapist’s plan is approved by physician       Initial Evaluation   Patient: Juan Jose Landers (78 y.o. male)   Examination Date: 2024   :  1945 MRN: [id number]   Visit #: 1    Insurance: Payor: HUMANA MEDICARE / Plan: HUMANA GOLD PLUS HMO / Product Type: *No Product type* /   Insurance ID: [id number] - (Medicare Managed)  Secondary Insurance (if applicable):    Treatment Diagnosis:     ICD-10-CM    1. Chronic pain of left knee  M25.562     G89.29       2. Weakness generalized  R53.1       3. Difficulty walking  R26.2       4. Difficulty walking down stairs  R26.2          Medical Diagnosis:  Localized osteoarthritis of left knee [M17.12]   Referring Physician: Deep Lopez MD  PCP: Prakash Shen MD                              Precautions/ Contra-indications:           Latex allergy:  NO  Pacemaker:    NO  Contraindications for Manipulation: NA  Date of Surgery: N/A  Other:     Red Flags:  None    C-SSRS Triggered by Intake questionnaire:   [x] No, Questionnaire did not trigger screening.   [] Yes, Patient intake triggered further evaluation      [] C-SSRS Screening completed  [] PCP notified via Plan of Care  [] Emergency services notified     Preferred

## 2024-02-12 ENCOUNTER — OFFICE VISIT (OUTPATIENT)
Dept: DERMATOLOGY | Age: 79
End: 2024-02-12
Payer: MEDICARE

## 2024-02-12 DIAGNOSIS — D22.9 MULTIPLE NEVI: Primary | ICD-10-CM

## 2024-02-12 DIAGNOSIS — L82.1 SEBORRHEIC KERATOSIS: ICD-10-CM

## 2024-02-12 DIAGNOSIS — Z85.828 HISTORY OF NONMELANOMA SKIN CANCER: ICD-10-CM

## 2024-02-12 DIAGNOSIS — Z86.018 HISTORY OF DYSPLASTIC NEVUS: ICD-10-CM

## 2024-02-12 DIAGNOSIS — L57.0 AK (ACTINIC KERATOSIS): ICD-10-CM

## 2024-02-12 DIAGNOSIS — L73.9 FOLLICULITIS: ICD-10-CM

## 2024-02-12 DIAGNOSIS — D48.5 NEOPLASM OF UNCERTAIN BEHAVIOR OF SKIN: ICD-10-CM

## 2024-02-12 PROCEDURE — 1123F ACP DISCUSS/DSCN MKR DOCD: CPT | Performed by: DERMATOLOGY

## 2024-02-12 PROCEDURE — G8427 DOCREV CUR MEDS BY ELIG CLIN: HCPCS | Performed by: DERMATOLOGY

## 2024-02-12 PROCEDURE — G8484 FLU IMMUNIZE NO ADMIN: HCPCS | Performed by: DERMATOLOGY

## 2024-02-12 PROCEDURE — 11102 TANGNTL BX SKIN SINGLE LES: CPT | Performed by: DERMATOLOGY

## 2024-02-12 PROCEDURE — 11103 TANGNTL BX SKIN EA SEP/ADDL: CPT | Performed by: DERMATOLOGY

## 2024-02-12 PROCEDURE — 1036F TOBACCO NON-USER: CPT | Performed by: DERMATOLOGY

## 2024-02-12 PROCEDURE — G8417 CALC BMI ABV UP PARAM F/U: HCPCS | Performed by: DERMATOLOGY

## 2024-02-12 PROCEDURE — 99213 OFFICE O/P EST LOW 20 MIN: CPT | Performed by: DERMATOLOGY

## 2024-02-12 NOTE — PROGRESS NOTES
[facility] Dermatology  Kimmy Godinez MD  [phone]      Juan Jose Landers  1945    78 y.o. male     Date of Visit: 2/12/2024    Chief Complaint: f/u skin cancer, lesions  Chief Complaint   Patient presents with    Skin Exam     Last seen: 2-2023  his wife is a patient    History of Present Illness:    1. Here for evaluation of multiple asx brown and red lesions on the trunk and extremities, present for many years; no change in size/shape/color of any lesions; no bleeding lesions.  S/p bx of lesion on the abdomen at previous visit - read as atypical nevus (mild dysplasia, removed with bx).  No probs since last seen.    2. F/u AK's - no probs with previous sites and no new concerns.    3.  He has a concerning pink spot on the R upper outer arm .  Asx.   He also has a growing filiform papule under the eye that it starting to be visble with downward gaze.    4. F/u skin cancer - BCC on the face and SCC on the chest.  No probs with previous sites.  No new concerns.    5. F/u folliculitis - scalp - flares focally, intermittently.  clinda topically helps.  Hasn't needed lately.    Previously with discoloration of the L 3rd toenail - grew out and resolved.  Has recurred but growing out again.  Planning to see podiatrist for chronic ingrown nail.    superficial SCC of chest (8/2009) s/p ED&C - treated at   BCC on the face    He avoids the sun or wears sunscreen/hat when he is out.  No tanning bed use.    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    Review of Systems:  Gen: Feels well, good sense of health.  Skin: No changing moles or lesions.    Past Medical History, Family History, Surgical History, Medications and Allergies reviewed.    Past Medical History:   Diagnosis Date    Hyperlipidemia     Hypertension        Past Surgical History:   Procedure Laterality Date    KNEE ARTHROSCOPY         Outpatient Medications Marked as Taking for the 2/12/24 encounter (Office

## 2024-02-12 NOTE — PATIENT INSTRUCTIONS

## 2024-02-20 ENCOUNTER — TELEPHONE (OUTPATIENT)
Dept: DERMATOLOGY | Age: 79
End: 2024-02-20

## 2024-02-22 NOTE — TELEPHONE ENCOUNTER
R upper arm - benign keratosis  R infraorbital - benign inflamed keratosis    No further treatment is needed at this time.  Please call if any concerns, particularly if pain, bleeding or new lesion growing.

## 2025-04-01 ENCOUNTER — OFFICE VISIT (OUTPATIENT)
Age: 80
End: 2025-04-01

## 2025-04-01 DIAGNOSIS — D22.9 MULTIPLE NEVI: Primary | ICD-10-CM

## 2025-04-01 DIAGNOSIS — D17.0 LIPOMA OF NECK: ICD-10-CM

## 2025-04-01 DIAGNOSIS — L57.0 AK (ACTINIC KERATOSIS): ICD-10-CM

## 2025-04-01 DIAGNOSIS — D48.5 NEOPLASM OF UNCERTAIN BEHAVIOR OF SKIN: ICD-10-CM

## 2025-04-01 DIAGNOSIS — L82.1 SEBORRHEIC KERATOSIS: ICD-10-CM

## 2025-04-01 DIAGNOSIS — Z86.018 HISTORY OF DYSPLASTIC NEVUS: ICD-10-CM

## 2025-04-01 DIAGNOSIS — L73.9 FOLLICULITIS: ICD-10-CM

## 2025-04-01 DIAGNOSIS — Z85.828 HISTORY OF NONMELANOMA SKIN CANCER: ICD-10-CM

## 2025-04-01 NOTE — PATIENT INSTRUCTIONS
Biopsy Wound Care Instructions    Keep the bandage in place for 24 hours.   Cleanse the wound with mild soapy water daily  Gently dry the area.  Apply Vaseline or petroleum jelly to the wound using a cotton tipped applicator.  Cover with a clean bandage.  Repeat this process until the biopsy site is healed.  If you had stitches placed, continue treating the site until the stitches are removed. Remember to make an appointment to return to have your stitches removed by our staff.  You may shower and bathe as usual.       ** Biopsy results generally take around 7 business days to come back.  If you have not heard from us by then, please call the office at [phone].    *Please note that biopsy results are released to both the patient and physician at the same time in [place].  Please allow time for your physician to review the results.  One of our staff members will reach out to you with the results and plan.

## 2025-04-01 NOTE — PROGRESS NOTES
[facility] Dermatology  Kimmy Godinez MD  [phone]      Juan Jose Landers  1945    79 y.o. male     Date of Visit: 4/1/2025    Chief Complaint: f/u skin cancer, lesions  Chief Complaint   Patient presents with    Skin Exam     Last seen: 2-2024  his wife is a patient    History of Present Illness:    1. Here for evaluation of multiple asx brown and red lesions on the trunk and extremities, present for many years; no change in size/shape/color of any lesions; no bleeding lesions.  S/p bx of lesion on the abdomen at previous visit - read as atypical nevus (mild dysplasia, removed with bx).  No probs since last seen.    2. F/u AK's - no probs with previous sites and no new concerns.    3.  He has a concerning pigmented spot on the upper back.  He was not aware of it.    4. F/u skin cancer - BCC on the face and SCC on the chest.  No probs with previous sites.  No new concerns.    5. F/u folliculitis - scalp - flares focally, intermittently.  clinda topically helps.  Hasn't needed lately.    6. He has an asx lump on the L upper neck/mastoid area.  Unsure of duration.      Bx's 2024:  R upper posterior arm -verrucous keratosis  R infraorbital - ISK     Previously with discoloration of the L 3rd toenail - grew out and resolved.  Has recurred but growing out again.  Planning to see podiatrist for chronic ingrown nail.    superficial SCC of chest (8/2009) s/p ED&C - treated at   BCC on the face    He avoids the sun or wears sunscreen/hat when he is out.  No tanning bed use.    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    Review of Systems:  Gen: Feels well, good sense of health.  Skin: No changing moles or lesions.    Past Medical History, Family History, Surgical History, Medications and Allergies reviewed.    Past Medical History:   Diagnosis Date    Hyperlipidemia     Hypertension        Past Surgical History:   Procedure Laterality Date    KNEE ARTHROSCOPY

## 2025-04-07 LAB — DERMATOLOGY PATHOLOGY REPORT: ABNORMAL

## 2025-04-10 ENCOUNTER — TELEPHONE (OUTPATIENT)
Age: 80
End: 2025-04-10

## 2025-04-10 ENCOUNTER — RESULTS FOLLOW-UP (OUTPATIENT)
Age: 80
End: 2025-04-10

## 2025-04-30 ENCOUNTER — PROCEDURE VISIT (OUTPATIENT)
Age: 80
End: 2025-04-30

## 2025-04-30 DIAGNOSIS — D03.59 MELANOMA IN SITU OF BACK (HCC): Primary | ICD-10-CM

## 2025-04-30 NOTE — PATIENT INSTRUCTIONS
Surgical Wound Care Instructions    Sutured Wounds:    After your surgery, go home and take it easy.  Please refrain from any vigorous physical activity or heavy lifting for 14 days.    Leave the pressure bandage in place for 48 hours.  If it starts to detach, reinforce the bandage with another piece of tape.    Please keep the bandage dry for 48 hours.    After 48 hours, the wound can get wet.  Clean the area daily using mild soapy water.    Apply a thin layer of Aquaphor, Vaseline or petroleum jelly.    Apply a non stick gauze pad or bandage to the surgical wound daily and secure with medical tape for 14 days.    Special Sites:    Facial sites:    Keep the wound elevated for the first 2 nights.    Do not sleep on the side of the body where the wound is located.    Do not bend your head lower than your heart or engage in heavy lifting.    Leg:    Keep the leg elevated when reclining, using a pillow to elevate the extremity.    Complications:    If bleeding develops when you go home, apply pressure for 15 minutes continuously.  A small amount of ice in a bag covered with a towel may be used for another 10 minutes if necessary.  If the bleeding does not stop, please call your dermatologist.    Call your doctor if you have any of these signs of infection:     Skin around the wound is more red, swollen or feels hot    Wound smells bad    Pus drainage    Fever    Increasing pain       *SUTURES OUT in 2 weeks (leave in for 14 days)

## 2025-04-30 NOTE — PROGRESS NOTES
[facility] Dermatology  Kimmy Godinez MD  [phone]      Juan Jose Landers  1945    80 y.o. 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  Biopsy was done 4/1/25 and read as melanoma in situ, not excised.  Comment: The lesion extends to the deep margin via follicular epithelium. There is also an associated dermal nevus.    No pacemaker or defibrillator  No blood thinners  No recent joint replacement      Bx's 2024:  R upper posterior arm -verrucous keratosis  R infraorbital - ISK     Previously with discoloration of the L 3rd toenail - grew out and resolved.  Has recurred but growing out again.  Planning to see podiatrist for chronic ingrown nail.    superficial SCC of chest (8/2009) s/p ED&C - treated at   BCC on the face    He avoids the sun or wears sunscreen/hat when he is out.  No tanning bed use.    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    Review of Systems:  Gen: Feels well, good sense of health.  Skin: No changing moles or lesions.    Past Medical History, Family History, Surgical History, Medications and Allergies reviewed.    Past Medical History:   Diagnosis Date    Hyperlipidemia     Hypertension        Past Surgical History:   Procedure Laterality Date    KNEE ARTHROSCOPY         Outpatient Medications Marked as Taking for the 4/30/25 encounter (Procedure visit) with Kimmy Godinez MD   Medication Sig Dispense Refill    clindamycin (CLEOCIN T) 1 % external solution Apply to affected areas on the scalp daily - BID prn flares. 60 mL 4    ezetimibe (ZETIA) 10 MG tablet TAKE 1 TABLET BY MOUTH DAILY      hydroCHLOROthiazide (HYDRODIURIL) 25 MG tablet       losartan

## 2025-05-05 LAB — DERMATOLOGY PATHOLOGY REPORT: NORMAL

## 2025-06-12 ENCOUNTER — OFFICE VISIT (OUTPATIENT)
Dept: ORTHOPEDIC SURGERY | Age: 80
End: 2025-06-12

## 2025-06-12 VITALS — BODY MASS INDEX: 29.2 KG/M2 | HEIGHT: 70 IN | WEIGHT: 204 LBS

## 2025-06-12 DIAGNOSIS — M25.562 LEFT KNEE PAIN, UNSPECIFIED CHRONICITY: Primary | ICD-10-CM

## 2025-06-12 DIAGNOSIS — M17.0 PRIMARY OSTEOARTHRITIS OF BOTH KNEES: ICD-10-CM

## 2025-06-12 RX ORDER — LIDOCAINE HYDROCHLORIDE 10 MG/ML
2 INJECTION, SOLUTION INFILTRATION; PERINEURAL ONCE
Status: COMPLETED | OUTPATIENT
Start: 2025-06-12 | End: 2025-06-12

## 2025-06-12 RX ORDER — METHYLPREDNISOLONE ACETATE 40 MG/ML
80 INJECTION, SUSPENSION INTRA-ARTICULAR; INTRALESIONAL; INTRAMUSCULAR; SOFT TISSUE ONCE
Status: COMPLETED | OUTPATIENT
Start: 2025-06-12 | End: 2025-06-12

## 2025-06-12 RX ADMIN — LIDOCAINE HYDROCHLORIDE 2 ML: 10 INJECTION, SOLUTION INFILTRATION; PERINEURAL at 09:59

## 2025-06-12 RX ADMIN — METHYLPREDNISOLONE ACETATE 80 MG: 40 INJECTION, SUSPENSION INTRA-ARTICULAR; INTRALESIONAL; INTRAMUSCULAR; SOFT TISSUE at 09:59

## 2025-06-12 NOTE — PROGRESS NOTES
Chief Complaint    Follow-up (LEFT KNEE )      History of Present Illness:  Juan Jose Landers is a 80 y.o. male who presents for a follow-up on his left knee.  This patient is known to [facility] orthopedics and is being treated conservatively for their arthritis.  This conservative treatment includes: Rest, ice, activity modification, home exercises, stretches, and over-the-counter pain medication.  The patient is gotten excellent relief from corticosteroid injections.  He felt that it took the edge off and his last until about 2 months ago when she started conducting a lot of yard work and lifting heavy objects.  His pain is predominantly in the medial compartment of the left knee but he gets some discomfort in patellofemoral compartment with stairs.  He rates pain 8/10 describes as dull and achy.  It is worse with activity.  The patient denies any new injury. The patient denies any numbness, paresthesias, or weakness.     Pain Assessment  Location of Pain: Knee  Location Modifiers: Left  Severity of Pain: 8  Quality of Pain: Aching  Duration of Pain: Persistent  Frequency of Pain: Constant  Aggravating Factors: Walking  Limiting Behavior: Yes  Relieving Factors: Rest, Ice, Heat, Nsaids  Result of Injury: No  Work-Related Injury: No    Physical exam:  Inspection: Both knees without erythema, ecchymosis, discoloration, abrasion, contusions, deformity or signs of infection.  Palpation: There is tenderness to palpation to the joint line of the left knee. There is patellofemoral crepitus palpable in both knees.  No tenderness to palpation to any other osseous or soft tissue structures of the knee.  Range of motion: Grossly intact  Neurovascular: Patient is neurovascularly intact, equally bilaterally.  2+ dorsal pedal pulses.      Procedures:    After informed consent was provided, the patient was seated on the exam table with the left knee flexed to 90 degrees. The anterolateral aspect of the knee adjacent to the joint line was

## 2025-07-07 ENCOUNTER — TELEPHONE (OUTPATIENT)
Dept: ORTHOPEDIC SURGERY | Age: 80
End: 2025-07-07

## 2025-07-07 NOTE — TELEPHONE ENCOUNTER
Spoke with patient to let him know his OA knee brace is in at the [place] location. He is going to come in at 11:15 AM to be fit with his brace.

## 2025-07-08 DIAGNOSIS — M25.562 LEFT KNEE PAIN, UNSPECIFIED CHRONICITY: ICD-10-CM

## 2025-07-08 DIAGNOSIS — M17.0 PRIMARY OSTEOARTHRITIS OF BOTH KNEES: ICD-10-CM
